# Patient Record
Sex: FEMALE | Race: WHITE | Employment: OTHER | URBAN - METROPOLITAN AREA
[De-identification: names, ages, dates, MRNs, and addresses within clinical notes are randomized per-mention and may not be internally consistent; named-entity substitution may affect disease eponyms.]

---

## 2017-11-01 PROBLEM — I15.8 OTHER SECONDARY HYPERTENSION: Status: ACTIVE | Noted: 2017-11-01

## 2017-11-01 PROBLEM — Z3A.10 10 WEEKS GESTATION OF PREGNANCY (HCC): Status: ACTIVE | Noted: 2017-11-01

## 2018-01-04 PROBLEM — Z3A.10 10 WEEKS GESTATION OF PREGNANCY (HCC): Status: RESOLVED | Noted: 2017-11-01 | Resolved: 2018-01-04

## 2018-01-04 PROBLEM — I10 ESSENTIAL HYPERTENSION: Status: ACTIVE | Noted: 2018-01-04

## 2018-01-04 PROBLEM — Z3A.21 21 WEEKS GESTATION OF PREGNANCY (HCC): Status: ACTIVE | Noted: 2018-01-04

## 2018-04-19 PROBLEM — Z34.90 PREGNANCY (HCC): Status: ACTIVE | Noted: 2018-04-19

## 2018-04-19 NOTE — H&P
34 [facility] [place] Patient Status:  Observation    1985 MRN [id number]   Location [address]  Attending Roxana Rodriguez MD   Hosp Day # 0 PCP Ruchi Davenport MD     Date • influenza complications [OTHER] Maternal Grandmother    • psoriasis feet [OTHER] Sister      Social History:    Smoking status: Light Tobacco Smoker     Types: Cigarettes    Smokeless tobacco: Never Used    Comment: rare - social 1x/month    Alcohol us Results  Component Value Date   WBC 8.4 04/19/2018   HGB 12.8 04/19/2018   HCT 36.9 04/19/2018    04/19/2018   CREATSERUM 0.92 04/19/2018   BUN 7 04/19/2018    04/19/2018   K 3.7 04/19/2018    04/19/2018   CO2 23 04/19/2018   GLU 93 04/1

## 2018-04-22 PROBLEM — O16.9 HYPERTENSION AFFECTING PREGNANCY (HCC): Status: ACTIVE | Noted: 2018-04-22

## 2018-04-23 NOTE — H&P
34 [facility] [place] Patient Status:  Inpatient    1985 MRN [id number]   Location [address]  Attending Arielle Salgado MD   Hosp Day # 0 PCP Ruchi Stoll MD     Date of Mother    • iron def Larwence Nikki Mother    • influenza complications [OTHER] Maternal Grandmother    • psoriasis feet [OTHER] Sister      Social History:    Smoking status: Former Smoker     Types: Cigarettes    Smokeless tobacco: Never Used    Comment: rare - admission procedures and expectations were discussed in detail. All questions answered, all appropriate consents will be signed at the Hospital. Admission is planned for last noc. Surendra Flanagan Received 2 doses cytotec.   SROM about 7AM.  Labor started about 1 hr a

## 2018-04-23 NOTE — ANESTHESIA PREPROCEDURE EVALUATION
Anesthesia PreOp Note    HPI:     Lashanda Roe is a 28year old female who presents for preoperative consultation requested by: * No surgeons listed *    Date of Surgery: 4/23/2018    * No procedures listed *  Indication: * No pre-op diagnosis entered 04/23/18 0300    hydrOXYzine HCl injection 50 mg 50 mg Intramuscular Q4H PRN Ivett Stewart MD 50 mg at 04/23/18 0300    Nalbuphine HCl (NUBAIN) 10 MG/ML injection         hydrOXYzine HCl 50 MG/ML injection         lactated ringers infusion         fentaNYL (PITOCIN) 30 units/ 500 ml premix infusion 300 mL/hr Intravenous Continuous Michael Pradhan MD     oxyTOCIN (PITOCIN) 30 units/ 500 ml premix infusion 0.5-20 saman-units/min Intravenous Continuous Michael Pradhan MD     Sod Citrate-Citric Acid (BIC 134 (L) 04/22/2018    03/09/2018   K 3.8 04/22/2018   K 4.1 03/09/2018    04/22/2018    03/09/2018   CO2 20 (L) 04/22/2018   CO2 23.4 03/09/2018   BUN 9 04/22/2018   BUN 9 03/09/2018   CREATSERUM 0.95 04/22/2018   CREATSERUM 0.83 03/09/20

## 2018-04-23 NOTE — ANESTHESIA PROCEDURE NOTES
Labor Analgesia  Performed by: Tanya Watkins  Authorized by: Tanya Watkins     Start Time:  4/23/2018 1:05 PM  End Time:  4/23/2018 1:15 PM  Site Identification: surface landmarks    Reason for Block: labor epidural    Anesthesiologist:  Stewart Phillips

## 2018-04-24 NOTE — L&D DELIVERY NOTE
Carolyn Ojeda  [id number]  2018  8:22 PM      Delivery Note    Type of delivery:   Fetus:  · Position:OA  · Sex:Female  · Weight:6lb 9oz  · Apgars:8/9  Anesthesia: epidual  Episiotomy: No  Laceration:   · Location:1st degree vaginal Larwance Childes

## 2018-04-24 NOTE — ANESTHESIA POSTPROCEDURE EVALUATION
Patient: Shraddha Herrera    Procedure Summary     Date:  04/23/18 Room / Location:      Anesthesia Start:  6926 Anesthesia Stop:  2019    Procedure:  LABOR ANALGESIA Diagnosis:      Scheduled Providers:   Anesthesiologist:  Lamont Miranda MD    Anesth

## 2018-04-24 NOTE — LACTATION NOTE
LACTATION NOTE - MOTHER      Evaluation Type: Inpatient    Problems identified  Problems identified: Knowledge deficit    Maternal history  Other/comment: asthma    Breastfeeding goal  Breastfeeding goal: To maintain breast milk feeding per patient goal

## 2018-04-24 NOTE — LACTATION NOTE
This note was copied from a baby's chart.   LACTATION NOTE - INFANT    Evaluation Type  Evaluation Type: Inpatient    Problems & Assessment  Infant Assessment: Anterior fontanel soft and flat;Skin color: pink or appropriate for ethnicity;Hunger cues present

## 2018-04-24 NOTE — DISCHARGE SUMMARY
[facility]    Discharge Summary    Vickey Habermann [place] Patient Status:  Inpatient    1985 MRN [id number]   Location [address]  Attending Krystle Billy MD   [facility] Day # 0       Admission Date: 2018  Disc

## 2018-04-24 NOTE — PROGRESS NOTES
Ivon Johnson  [id number]  April 24, 2018  11:33 AM      Post-Partum Vaginal Delivery    Subjective: Doing well, no complaints    Objective:  Tolerating present diet, pain well controlled, ambulating  /84 (BP Location: Right arm)   Pulse 85   Temp

## 2018-04-25 NOTE — PROGRESS NOTES
Post-Partum Note   4/25/2018, 9:32 AM    Subjective:  PPD 2  No complaints. Lochia normal. Voiding normal. Not dizzy. Mood good. No headache, visual changes, RUQ/epigastric pain.       Objective:   04/24/18 2000 04/24/18  2284 04/25/18  1657 04/25/18  8104

## 2018-04-25 NOTE — PLAN OF CARE
ANXIETY    • Will report anxiety at manageable levels Progressing        PAIN - ADULT    • Verbalizes/displays adequate comfort level or patient's stated pain goal Progressing        Patient Centered Care    • Patient preferences are identified and Monica Rich

## 2018-04-27 PROBLEM — O10.019 BENIGN ESSENTIAL HTN, CHRONIC, ANTEPARTUM (HCC): Status: ACTIVE | Noted: 2018-04-27

## 2018-04-27 NOTE — H&P
34 [facility] [place] Patient Status:  Observation    1985 MRN [id number]   Location [address]  Attending Shant Byrnes MD   Hosp Day # 0 PCP Ruchi Deleon MD     D Relation Age of Onset   • Hypertension Mother    • iron def [OTHER] Mother    • influenza complications [OTHER] Maternal Grandmother    • psoriasis feet [OTHER] Sister      Social History:    Smoking status: Former Smoker     Types: Cigarettes    Smokeless

## 2018-04-27 NOTE — DISCHARGE SUMMARY
[facility] - [facility]    Discharge Summary    Zoila C[place] Patient Status:  Observation    1985 MRN [id number]   Location [address]  Attending Melissa Lenz MD   Hosp Day # 0 PCP Ruchi De La Torre MD     Da

## 2018-04-27 NOTE — PROGRESS NOTES
Dr Annemarie Cavanaugh aware of all b/p's. Orders to discharge and have pt continue to monitor bp at home. Prescription for Labatelol sent to pt pharmacy.

## 2018-04-27 NOTE — PROGRESS NOTES
S: Pt w/o c/o, nursing baby. BP : 130-152/86 - 99. 136/93 @ 0900. T 98.2/ HR 83/ RR 16  Exam : defered    Imp 1: PPD #4 s/p          2.  Chronic HTN  w/chronic proteinuria - exacerbation of severe BP last pm. No other c/o,               no neuro sx an

## 2018-04-27 NOTE — PROGRESS NOTES
BP's results reported to Dr. Nahid Valladares and Andre Dominguez the discharge with FF-up in 3 days in office for BP check.  Patient also advised to take BP 2-3 x day and call MD if BP is < 150/100 or > 120/70 per Dr. Bin Bautista instructions and also S/S to be reported to MD.

## 2021-02-11 NOTE — H&P
Faheem Arreola is a 28year old female. HPI:   Patient presents with:  Establish Care: Previous PCP retired. OB is Dr. Jennifer Olmos, last pap smear done about 2 years ago. Would like to discuss anxiety medication.  Has seen nephrologist in past for nain half-marathon, tennis    HISTORY:  Past Medical History:   Diagnosis Date   • Anxiety    • Asthma     exercise induced   • Benign essential HTN, chronic, antepartum 4/27/2018   • Extrinsic asthma, unspecified     exertional asthma as child   • History of r Congestion, Sinus Pain, Hoarseness, Sore throat, Rhinorrhea, Swallowing Difficulty  Eyes: Eye Pain, Swelling, Redness, Foreign Body, Discharge, Vision Changes  Cardiovascular: Chest Pain, SOB, PND, Dyspnea on Exertion, Orthopnea, Claudication, Edema, Palpi bilaterally  Neurologic: No focal neurological deficits, CN II-XII intact, light touch intact, MSK Strength 5/5 and symmetric in all extremities, normal gait  Musculoskeletal: Full range of motion of all extremities, no clubbing/swelling/edema  Skin: No le decreased libido, weight gain, upset stomach. She will let us know in 1 to 2 weeks if tolerable, we can increase the dose as tolerated  –Low-dose alprazolam 0.25 mg as needed for severe anxiety.   We discussed that side effects can include cardiopulmonary is her most pressing symptom, we will try to treat this and see if the blood pressure is improved. DM Screen: Check fasting sugar  HLD Screen: Check fasting lipid panel  Osteoporosis Screen (>65 or < 65 with FRAX > 9.3%):  Not indicated  HCV Screen: Consid

## 2021-02-11 NOTE — PATIENT INSTRUCTIONS
- You were seen in clinic for regular annual check-up. We have ordered labs for you and we will call you with the results. Please obtain the blood work fasting for accurate sugar and cholesterol levels.   You may only drink water the day of your blood draw

## 2021-02-15 PROBLEM — N64.3 GALACTORRHEA: Status: ACTIVE | Noted: 2021-02-15

## 2021-02-22 NOTE — TELEPHONE ENCOUNTER
I reviewed the blood work from 2/22/2021    CMP: Alk phos 118  Lipid panel:  Total cholesterol 206, LDL/bad cholesterol 133  –TSH and CBC within normal limits    Urine studies  Microalbumin to creatinine ratio 978  Random total protein 2 total creatinine ra

## 2021-04-26 NOTE — TELEPHONE ENCOUNTER
ISAEL to Dr. Adan Nolan---  Spoke to patient who confirms she has been leonel lisinopril 10 mg daily since it was Rx'ed-pt mentions she is almost due for another refill. Rx pended.  Pt states she has not been checking her daily BP's and is unable to recall a recen

## 2021-04-26 NOTE — TELEPHONE ENCOUNTER
Received notification from her insurance company that lisinopril was not picked up. Please call the patient and have her give us a condition update regarding her blood pressures, hopefully she has been checking them at home.     Would still recommend lisin

## 2021-11-19 NOTE — TELEPHONE ENCOUNTER
Please call pt  She is requesting an order for a COVID test, her daughter tested positive for COVID  Tasked to nursing

## 2021-11-19 NOTE — TELEPHONE ENCOUNTER
Called patient who daughter tested positive for covid Tuesday - covid test ordered per protocol   Patient is asymptomatic, quarantine, RN explained ot check temp 2 times daily , if high fever SOB chest pain or oxygen below 90 % to go to ER - verbalized und

## 2021-12-15 NOTE — TELEPHONE ENCOUNTER
To MD:  The above refill request is for a controlled substance. Please review pended medication order. Print and sign for staff to fax to pharmacy or prescribe electronically.     Last office visit: 2/15/21  Last time refill sent and quantity/refills:2/1

## 2022-07-08 NOTE — TELEPHONE ENCOUNTER
Patient calling for appointment with Dr. Gerardo Mar today. Gave her 3:00pm appointment for today. Patient has persistent throat tickle/cough. No other symptoms. Has tested negative for Covid. Route to triage per Covid protocol. Patient is aware triage is calling her back prior to appointment.     Best call back number [phone]

## 2022-07-08 NOTE — TELEPHONE ENCOUNTER
Spoke to Sherita Cruz. Symptoms started last Thursday  Describes a tickle in the back of her throat that causes her to \"cough pretty hard\". Cough is dry. She sometimes coughs so hard that it causes her to throw up. Describes chest muscles as very sore from coughing so much. Was recently at [address], thought it could be allergies, but has persisted. Denies fever or chills. No runny nose.   No headache or dizziness  Denies body aches  Denies shortness of breath  Denies recent sick contacts    Took at home COVID tests last Friday and Tuesday, both were negative  Was positive for COVID in May    Pt was already scheduled for 3 pm today with Dr Sheryle Frankel instructed to call from her car to check in when she arrives    FYI to Dr Barragan Erm

## 2022-11-04 NOTE — TELEPHONE ENCOUNTER
alant to pt    \"We have your refills requests foe escitalopram and lisinopril, our records show these were last sent in April of 2021 - did you stop taking these at some point and then restarted? Or has another physician been refilling these for you?  Please confirm for us so that we can continue to refill these safely\"

## 2022-11-04 NOTE — TELEPHONE ENCOUNTER
To [facility] for consult, please see my mychart to pt, this was her response: \"Hi-they were refilled in June or July\"       Navya Trimble  does show pt refilled them June 13th last, it seems she might be stopping/restarting these. Do we need to do anything else here or cont refilling?

## 2023-08-02 PROBLEM — J45.20 MILD INTERMITTENT ASTHMA WITHOUT COMPLICATION (HCC): Status: ACTIVE | Noted: 2023-08-02

## 2023-08-02 NOTE — PATIENT INSTRUCTIONS
- You were seen in clinic for regular annual check-up. We have ordered labs for you and we will call you with the results. Please obtain the bloodwork fasting for 12 hours. OK to drink water the day of your blood draw. We have the lab downstairs on the first floor. No appointment is necessary. Lab hours are Monday-Friday 7:30 AM to 4:45 PM.  There may be Saturday hours 7 AM-11:00 AM as well. Otherwise you can obtain the blood tests on the weekends at the main hospital or local immediate care/urgent care within the [facility]. -Today, we did reevaluate your blood pressure. We should increase the dose of lisinopril to 20 mg once a day. Continue checking your blood pressure at home. We may need to adjust medications if still elevated.    -We discussed conservative measures to decrease blood pressure over time:    -Targeted weight loss has been found to be the most effective way to decrease blood pressure over time  -Optimizing nutrition with incorporation of high-fiber foods such as fruits, vegetables, whole-grain sources of carbohydrates (brown rice/wheat bread). A specific nutritional program is called the DASH diet  -Cardiovascular exercise at least 150 minutes of moderate intensity exercise per week  -Eliminating or minimizing alcohol  -Decreasing salt intake, caffeine intake  -Getting adequate sleep of at least 7-8 hours  -Managing stress and anxiety      - Please continue following up with OB/GYN for well woman examinations. You may establish with Dr. Davi Oneill and consider tubal ligation in the future. We do need to get your blood pressure down to safely proceed with tubal ligation.  - Lets also continue managing your anxiety. Lets keep the current dose of the escitalopram 10 mg once a day and Xanax on a as needed basis. However, we may consider increasing her escitalopram if anxiety is a predominant issue contributing to her elevated blood pressure.   - Please continue to eat a varied diet including recommended servings of vegetables, fruits, and low fat dairy. Minimize high saturated fats (such as fast foods) and high sugar intake (such as soda)  - We recommend 150 minutes of moderate intensity exercise (brisk walking, swimming) weekly to maintain your current weight. Targeted weight loss will require more vigorous exercise or more than 150 minutes/week.     Return to clinic in 3-4 months for follow-up

## 2023-08-02 NOTE — H&P
Mary Amezquita is a 45year old female. HPI:   Patient presents with:  Routine Physical    MsDayana Perdomo is a 45year old female PMHx asthma, history of preeclampsia, essential hypertension with proteinuria, anxiety who presents today for annual physical examination. Doing well otherwise. No pain. She can have short fuses with kids and feels it is related to her high BP, possibly some anxiety    Sleep is still a struggle, daughter with some regulatory issues. Working through stressors. Xanax is used moreso for social events. OBGYN  - LMP: last week, usual time. - Interested in getting her tubes tied. Amenable to seeing OB/GYN    I did update patient's past medical history, past surgical history, family history, social history and updated as below    [address]: 2 daughters; safe at home  - Work:  - full time in September. - Hobbies: tennis, half-marathon; cross-stitching, reading, crafts  - Nutrition: working on balanced - fruits and veggies moreso pasta/ pizza, oatmeals, eggs/chicken/pork/steak. Drinking a lot of water. Coffee  - Physical Activity: walking, biking with the kids. Weights.      HISTORY:  Past Medical History:   Diagnosis Date    Anxiety     Asthma     exercise induced    Benign essential HTN, chronic, antepartum 2018    Extrinsic asthma, unspecified     exertional asthma as child    History of reactive hypoglycemia     Preeclampsia 10/7/2014    Pregnancy-induced hypertension     Proteinuria       Past Surgical History:   Procedure Laterality Date          OTHER SURGICAL HISTORY      wisdom teeth x 4 --local only      Family History   Problem Relation Age of Onset    Hypertension Mother     Other (iron def) Mother     Other (influenza complications) Maternal Grandmother     Other (psoriasis feet) Sister       Social History:   Social History     Socioeconomic History    Marital status:    Tobacco Use    Smoking status: Former     Types: Cigarettes Smokeless tobacco: Never   Vaping Use    Vaping Use: Never used   Substance and Sexual Activity    Alcohol use: Yes     Alcohol/week: 1.7 standard drinks of alcohol     Types: 2 Glasses of wine per week     Comment: once every 2 weeks    Drug use: No        Medications (Active prior to today's visit):  Current Outpatient Medications   Medication Sig Dispense Refill    lisinopril 20 MG Oral Tab Take 1 tablet (20 mg total) by mouth daily. 90 tablet 3    ALPRAZolam 0.25 MG Oral Tab Take 1 tablet (0.25 mg total) by mouth every 6 (six) hours as needed. 30 tablet 1    escitalopram 10 MG Oral Tab Take 1 tablet (10 mg total) by mouth daily. 90 tablet 3    Albuterol Sulfate HFA (PROVENTIL HFA) 108 (90 BASE) MCG/ACT Inhalation Aero Soln Inhale 2 puffs into the lungs every 6 (six) hours as needed for Wheezing. 18 g 3       Allergies:  No Known Allergies      ROS:   Positive Findings indicated in BOLD    Constitutional: Fever, Chills, Weight Gain, Weight Loss, Night Sweats, Fatigue, Malaise  ENT/Mouth:  Hearing Changes, Ear Pain, Nasal Congestion, Sinus Pain, Hoarseness, Sore throat, Rhinorrhea, Swallowing Difficulty  Eyes: Eye Pain, Swelling, Redness, Foreign Body, Discharge, Vision Changes  Cardiovascular: Chest Pain, SOB, PND, Dyspnea on Exertion, Orthopnea, Claudication, Edema, Palpitations  Respiratory: Cough, Sputum, Wheezing, Shortness of breath  Gastrointestinal: Nausea, Vomiting, Diarrhea, Constipation, Pain, Heartburn, Dysphagia, Bloody stools, Tarry stools  Genitourinary: Dysmenorrhea, Dysuria, Urinary Frequency, Hematuria, Urinary Incontinence, Urgency,  Flank Pain  Musculoskeletal: Arthralgias, Myalgias, Joint Swelling, Joint Stiffness, Back Pain, Neck Pain  Integumentary: Skin Lesions, Pruritis, Hair Changes, Jaundice, Nail changes  Neuro: Weakness, Numbness, Paresthesias, Loss of Consciousness, Syncope, Dizziness, Headache, Falls  Psych:  Anxiety, Depression, Insomnia, Suicidal Ideation, Homicidal ideation, Memory Changes  Heme/Lymph: Bruising, Bleeding, Lymphadenopathy  Endocrine: Polyuria, Polydipsia, Temperature Intolerance    PHYSICAL EXAM:   Vital Signs:  Blood pressure (!) 147/99, pulse 103, resp. rate 18, height 5' 4\" (1.626 m), weight 218 lb 6 oz (99.1 kg), last menstrual period 07/25/2023, SpO2 98 %, not currently breastfeeding. Constitutional: No acute distress. Alert and oriented x 3. Eyes: EOMI, PERRLA, clear sclera b/l  HENT: NCAT, Moist mucous membranes, Oropharynx without erythema or exudate; +hirsutism of under the chin - mild  Neck: No JVD, no thyromegaly  Cardiovascular: S1, S2, no S3, no S4, Regular rate and rhythm, No murmurs/gallops/rubs. Vascular: Equal pulses 2+ carotids w/o bruits nor thrills/DP/PT  Respiratory: Clear to auscultation bilaterally. No wheezes/rales/rhonchi  Gastrointestinal: Soft, nontender, nondistended. Positive bowel sounds x 4. No rebound tenderness. No hepatomegaly, No splenomegaly  Genitourinary: No CVA tenderness bilaterally  Neurologic: No focal neurological deficits, CN II-XII intact, light touch intact, MSK Strength 5/5 and symmetric in all extremities, normal gait  Musculoskeletal: Full range of motion of all extremities, no clubbing/swelling/edema  Skin: No lesions, No erythema, no jaundice, Cap Refill < 2s  Psychiatric: Appropriate mood and affect  Heme/Lymph/Immune: No cervical  LAD    Breast exam: Appreciate assistance from chaperone VENITA Posadas  -Inspection: No suspicious skin abnormalities  -Palpation: No nodules palpated bilaterally  -No axillary lymphadenopathy bilaterally        DATA REVIEWED   Labs:  No results found for this or any previous visit (from the past 8760 hour(s)). No results found for this or any previous visit (from the past 8760 hour(s)). ASSESSMENT/PLAN:       History of preeclampsia  Essential hypertension  Persistent high blood pressures 170s/110s post-partum. Previously on labetalol. Repeat blood pressures remain elevated 150s. Discussed that this may be a continuation of her preeclampsia with essential hypertension. Also consider secondary causes with anxiety being a potential cause. - Increase lisinopril to 20 mg once a day  -Blood pressure log at home, she will let us know how her blood pressures are in the next visit  - She will check in with us through [facility] in 3-4 weeks on how her blood pressures are looking. Other conservative measures:  -Targeted weight loss has been found to be the most effective way to decrease blood pressure over time  -Optimizing nutrition with incorporation of high-fiber foods such as fruits, vegetables, whole-grain sources of carbohydrates (brown rice/wheat bread). A specific nutritional program is called the DASH diet  -Cardiovascular exercise at least 150 minutes of moderate intensity exercise per week  -Eliminating or minimizing alcohol  -Decreasing salt intake, caffeine intake  -Getting adequate sleep of at least 7-8 hours  -Managing stress and anxiety    Hyperlipidemia  , T chol 206  - Fasting blood work as below  - Weight loss efforts discussed    Anxiety  Going through psychosocial stressors at this time. Her usual coping mechanisms including exercise and outdoor activities unable to be done during the pandemic.  -She is established with therapy, Ms. Bharat Negrete. She will continue following up with her  -Lexapro 10 mg daily. We discussed the side effects and its delayed efficacy in about 4 to 6 weeks. She will let us know if she is experiencing any decreased libido, weight gain, upset stomach. She will let us know in 1 to 2 weeks if tolerable, we can increase the dose as tolerated  -Low-dose alprazolam 0.25 mg as needed for severe anxiety. We discussed that side effects can include cardiopulmonary depression. She should avoid other depressant substances including alcohol while on this medication.   Would only use judiciously during severe severe episodes of anxiety    Hirsutism  Consider possible PCOS. She reports a prior history of irregular menses, was able to get pregnant twice however and is more regular now. She does have evidence of hyperandrogenism with hirsutism under the chin.  -She fulfills 1 criteria for PCOS  - Follow-up with OB/GYN possible PCOS    Asthma  Home medications: Albuterol 2 puffs every 6 hours as needed  -Well-controlled this time, continue present management    Persistent proteinuria  Evaluated by nephrology in 2018. She experienced persistent proteinuria a year after delivery. -Most recent microalbumin creatinine ratio 978.7 in 2021  - Repeat urine studies       Orders This Visit:  Orders Placed This Encounter      CBC With Differential With Platelet      Comp Metabolic Panel (14)      Lipid Panel      Hemoglobin A1C      TSH W Reflex To Free T4      Urinalysis with Culture Reflex      Microalb/Creat Ratio, Random Urine      Meds This Visit:  Requested Prescriptions     Signed Prescriptions Disp Refills    lisinopril 20 MG Oral Tab 90 tablet 3     Sig: Take 1 tablet (20 mg total) by mouth daily. Imaging & Referrals:  OBG - INTERNAL       Health Maintenance  HTN Screen: Elevated as above -as anxiety is her most pressing symptom, we will try to treat this and see if the blood pressure is improved. DM Screen: Check fasting sugar  HLD Screen: Check fasting lipid panel  Osteoporosis Screen (>65 or < 65 with FRAX > 9.3%): Not indicated  HCV Screen: Considered low risk  HIV Screen: considered low risk  G/C/Syphilis: Considered low risk    Colon Cancer Screening (50-70): Not indicated  Breast Cancer Screening (50-70): Not indicated  Cervical Cancer Screening (21-64): Last Pap: DR. Maximino Alvarez - all normal; will establish with Dr. Olinda Jon at [address] [place] [address] [employer] (55-79 with 30 p/year and active < 15 years): Not indicated    Influenza: Annually - UTD  Td/Tdap: Last Tdap: Last 2013, due 2023  Zoster (60+):  Not indicated  HPV (19-26): Not indicated  Pneumococcal: Not indicated    Immunization History   Administered Date(s) Administered    Covid-19 Vaccine Pfizer 30 mcg/0.3 ml 02/20/2021, 03/13/2021, 12/28/2021    Covid-19 Vaccine Pfizer Bivalent 30mcg/0.3mL 10/29/2022    Flucelvax 0.5ml Vaccine 10/29/2022    Fluvirin, 3 Years & >, Im 10/30/2021    Influenza 12/15/2020    TDAP 01/07/2013       Nel Koch MD, 08/03/23, 4:38 PM

## 2024-02-18 NOTE — TELEPHONE ENCOUNTER
Reason for call:  Tested positive for COVID      Allergies:  No Known Allergies        Discussed with patient :  Youngest child was positive Friday. She tested positive for COVID. Achy, stuffy. She was nauseous. Felt like a bad hangover, some still persistent symptoms. Manageable at home, does not feel lke she hast o go to ER      Recommendations:  Sent paxlovid    Recommend that the patient continue to check temperature and if possible pulse ox at home.  Monitor for any worsening of symptoms such as worsening fevers, worsening shortness of breath, any new or severe pain in the chest or legs.  If so, patient should call the clinic as a more urgent evaluation may be needed.      Patient should focus on symptomatic support at this time.  Can use NSAIDs for any pain, headaches.  Continue to maintain nutrition and hydration status.    Requirements for ending self quarantine if COVID test is positive.  - Time duration of 5 days from symptom onset.  If clinically improving, can discontinue quarantine after day 5 and wear a mask for additional 5 days.  If still significant symptoms, can proceed with a full 10-day course of quarantining.  -Overall clinical improvement  - No fever for at least 24 hours without the use of any antipyretics (such as Tylenol)    If tested positive, no repeat testing should be done within 90 days.  As test can remain positive even after resolution of symptoms.

## 2024-04-04 ENCOUNTER — TELEPHONE (OUTPATIENT)
Dept: INTERNAL MEDICINE CLINIC | Facility: CLINIC | Age: 39
End: 2024-04-04

## 2024-04-04 DIAGNOSIS — F41.9 ANXIETY: ICD-10-CM

## 2024-04-04 NOTE — TELEPHONE ENCOUNTER
Pt called, Alprazolam prescription is     Requests refill is sent to [facility] in [place]

## 2024-04-05 RX ORDER — ALPRAZOLAM 0.25 MG/1
0.25 TABLET ORAL EVERY 6 HOURS PRN
Qty: 30 TABLET | Refills: 1 | Status: SHIPPED | OUTPATIENT
Start: 2024-04-05

## 2024-04-05 NOTE — TELEPHONE ENCOUNTER
To MD:  The above refill request is for a controlled substance.  Please review pended medication order.   Print and sign for staff to fax to pharmacy or prescribe electronically.    Last office visit: 8/3/23  Last time refill sent and quantity/refills: 1/17/2024 qty 30 plus 1

## 2024-08-16 PROBLEM — Z34.90 PREGNANCY (HCC): Status: RESOLVED | Noted: 2018-04-19 | Resolved: 2024-08-16

## 2024-08-16 PROBLEM — Z3A.21 21 WEEKS GESTATION OF PREGNANCY (HCC): Status: RESOLVED | Noted: 2018-01-04 | Resolved: 2024-08-16

## 2024-08-16 PROBLEM — O10.019 BENIGN ESSENTIAL HTN, CHRONIC, ANTEPARTUM (HCC): Status: RESOLVED | Noted: 2018-04-27 | Resolved: 2024-08-16

## 2024-08-16 PROBLEM — O16.9 HYPERTENSION AFFECTING PREGNANCY (HCC): Status: RESOLVED | Noted: 2018-04-22 | Resolved: 2024-08-16

## 2024-11-06 RX ORDER — LISINOPRIL 20 MG/1
20 TABLET ORAL DAILY
Qty: 90 TABLET | Refills: 0 | Status: SHIPPED | OUTPATIENT
Start: 2024-11-06

## 2024-11-06 NOTE — TELEPHONE ENCOUNTER
Noted. Refill sent to last patient until the time of the appointment.    Requested Prescriptions     Signed Prescriptions Disp Refills    lisinopril 20 MG Oral Tab 90 tablet 0     Sig: TAKE 1 TABLET(20 MG) BY MOUTH DAILY     Authorizing Provider: DAVID KITCHEN     Ordering User: CATIE WHEATLEY

## 2024-11-06 NOTE — TELEPHONE ENCOUNTER
Patient last saw  8/3/23. Needs appointment for annual physical. To EMA  to please call patient and assist with scheduling then back to Rx group

## 2025-01-26 NOTE — H&P
Carmencita Gutierrez is a 39 year old female.    HPI:     Chief Complaint   Patient presents with    Physical     See's gyne at [facility]. Will be making an appointment sone     Ms. Condon is a 39 year old female PMHx asthma, history of preeclampsia, essential hypertension with proteinuria, anxiety who presents today for annual physical examination.    No pain any where.    Work-life balance is good. Flexible work but more demands with kids' activities. 6-7 hours. No anxiety nor depression. The anxiety has improved. Xanax use is very minimal for big group gratherings.      OBGYN  - LMP: above 1 month. It is regular.     I did update patient's past medical history, past surgical history, family history, social history and updated as below    SocHX  - Home: 2 daughters; safe at home  - Work:  - full time in September.  - Hobbies: cross-stitching, reading, crafts.  - Nutrition: salads, leftovers for lunch, pasta - lean meats. Occasional pizza. Fruits. Water is good.   - Physical Activity: not as much - walking, difficulty with the winter. Soccer and playing with kids.     HISTORY:  Past Medical History:    Anxiety    Asthma (HCC)    exercise induced    Benign essential HTN, chronic, antepartum (HCC)    Extrinsic asthma, unspecified    exertional asthma as child    History of reactive hypoglycemia    Preeclampsia (HCC)    Pregnancy-induced hypertension (HCC)    Proteinuria      Past Surgical History:   Procedure Laterality Date          Other surgical history      wisdom teeth x 4 --local only      Family History   Problem Relation Age of Onset    Hypertension Mother     Other (iron def) Mother     Other (influenza complications) Maternal Grandmother     Other (psoriasis feet) Sister       Social History:   Social History     Socioeconomic History    Marital status:    Tobacco Use    Smoking status: Former     Types: Cigarettes     Passive exposure: Never    Smokeless tobacco: Never   Vaping Use     Vaping status: Never Used   Substance and Sexual Activity    Alcohol use: Yes     Alcohol/week: 2.0 standard drinks of alcohol     Types: 2 Glasses of wine per week     Comment: once every 2 weeks    Drug use: No   Social History Narrative    ** Merged History Encounter **             Medications (Active prior to today's visit):  Current Outpatient Medications   Medication Sig Dispense Refill    ALPRAZolam 0.25 MG Oral Tab Take 1 tablet (0.25 mg total) by mouth every 6 (six) hours as needed. 30 tablet 1    lisinopril 30 MG Oral Tab Take 1 tablet (30 mg total) by mouth daily. 90 tablet 3       Allergies:  No Known Allergies      ROS:   Positive Findings indicated in BOLD    Constitutional: Fever, Chills, Weight Gain, Weight Loss, Night Sweats, Fatigue, Malaise  ENT/Mouth:  Hearing Changes, Ear Pain, Nasal Congestion, Sinus Pain, Hoarseness, Sore throat, Rhinorrhea, Swallowing Difficulty  Eyes: Eye Pain, Swelling, Redness, Foreign Body, Discharge, Vision Changes  Cardiovascular: Chest Pain, SOB, PND, Dyspnea on Exertion, Orthopnea, Claudication, Edema, Palpitations  Respiratory: Cough, Sputum, Wheezing, Shortness of breath  Gastrointestinal: Nausea, Vomiting, Diarrhea, Constipation, Pain, Heartburn, Dysphagia, Bloody stools, Tarry stools  Genitourinary: Dysmenorrhea, Dysuria, Urinary Frequency, Hematuria, Urinary Incontinence, Urgency,  Flank Pain  Musculoskeletal: Arthralgias, Myalgias, Joint Swelling, Joint Stiffness, Back Pain, Neck Pain  Integumentary: Skin Lesions, Pruritis, Hair Changes, Jaundice, Nail changes  Neuro: Weakness, Numbness, Paresthesias, Loss of Consciousness, Syncope, Dizziness, Headache, Falls  Psych: Anxiety, Depression, Insomnia, Suicidal Ideation, Homicidal ideation, Memory Changes  Heme/Lymph: Bruising, Bleeding, Lymphadenopathy  Endocrine: Polyuria, Polydipsia, Temperature Intolerance    PHYSICAL EXAM:   Vital Signs:  Blood pressure 127/90, pulse 74, temperature 97.9 °F (36.6 °C),  temperature source Oral, height 5' 4\" (1.626 m), weight 215 lb 6.4 oz (97.7 kg), last menstrual period 12/20/2024, SpO2 98%, not currently breastfeeding.      Constitutional: No acute distress. Alert and oriented x 3.  Eyes: EOMI, PERRLA, clear sclera b/l  HENT: NCAT, Moist mucous membranes, Oropharynx without erythema or exudate; +hirsutism of under the chin - mild  Neck: No JVD, no thyromegaly  Cardiovascular: S1, S2, no S3, no S4, Regular rate and rhythm, No murmurs/gallops/rubs.   Vascular: Equal pulses 2+ carotids w/o bruits nor thrills/DP/PT  Respiratory: Clear to auscultation bilaterally.  No wheezes/rales/rhonchi  Gastrointestinal: Soft, nontender, nondistended. Positive bowel sounds x 4. No rebound tenderness. No hepatomegaly, No splenomegaly  Genitourinary: No CVA tenderness bilaterally  Neurologic: No focal neurological deficits, CN II-XII intact, light touch intact, MSK Strength 5/5 and symmetric in all extremities, normal gait  Musculoskeletal: Full range of motion of all extremities, no clubbing/swelling/edema  Skin: No lesions, No erythema, no jaundice, Cap Refill < 2s  Psychiatric: Appropriate mood and affect  Heme/Lymph/Immune: No cervical  LAD    Breast exam: no chaperone requested  -Inspection: No suspicious skin abnormalities  -Palpation: No nodules palpated bilaterally  -No axillary lymphadenopathy bilaterally        DATA REVIEWED   Labs:  No results found for this or any previous visit (from the past 8760 hours).    No results found for this or any previous visit (from the past 8760 hours).          ASSESSMENT/PLAN:       History of preeclampsia  Essential hypertension  Persistent high blood pressures 170s/110s post-partum.  Previously on labetalol.  Repeat blood pressures remain elevated 150s.  Discussed that this may be a continuation of her preeclampsia with essential hypertension.  Also consider secondary causes with anxiety being a potential cause.  - Increase lisinopril to 30 mg once a  day    Other conservative measures:  -Targeted weight loss has been found to be the most effective way to decrease blood pressure over time  -Optimizing nutrition with incorporation of high-fiber foods such as fruits, vegetables, whole-grain sources of carbohydrates (brown rice/wheat bread).  A specific nutritional program is called the DASH diet  -Cardiovascular exercise at least 150 minutes of moderate intensity exercise per week  -Eliminating or minimizing alcohol  -Decreasing salt intake, caffeine intake  -Getting adequate sleep of at least 7-8 hours  -Managing stress and anxiety    Hyperlipidemia  , T chol 206  - Fasting blood work as below  - Weight loss efforts discussed    Anxiety  Going through psychosocial stressors at this time.  Her usual coping mechanisms including exercise and outdoor activities unable to be done during the pandemic.  -She is established with therapy  -Low-dose alprazolam 0.25 mg as needed for severe anxiety.  We discussed that side effects can include cardiopulmonary depression.  She should avoid other depressant substances including alcohol while on this medication.  Would only use judiciously during severe severe episodes of anxiety    Hirsutism  Consider possible PCOS.  She reports a prior history of irregular menses, was able to get pregnant twice however and is more regular now.  She does have evidence of hyperandrogenism with hirsutism under the chin.  -She fulfills 1 criteria for PCOS  - Follow-up with OB/GYN possible PCOS    Asthma  Home medications: Albuterol 2 puffs every 6 hours as needed  -Well-controlled this time, continue present management    Persistent proteinuria  Evaluated by nephrology in 2018.  She experienced persistent proteinuria a year after delivery.  -Most recent microalbumin creatinine ratio 978.7 in 2021  - Repeat urine studies  - She would like to see Nephrology, referral for Dr. Samuel Ba This Visit:  Orders Placed This Encounter    Procedures    CBC With Differential With Platelet    Comp Metabolic Panel (14)    Hemoglobin A1C    Lipid Panel    TSH W Reflex To Free T4    Microalb/Creat Ratio, Random Urine    TETANUS, DIPHTHERIA TOXOIDS AND ACELLULAR PERTUSIS VACCINE (TDAP), >7 YEARS, IM USE       Meds This Visit:  Requested Prescriptions     Signed Prescriptions Disp Refills    ALPRAZolam 0.25 MG Oral Tab 30 tablet 1     Sig: Take 1 tablet (0.25 mg total) by mouth every 6 (six) hours as needed.    lisinopril 30 MG Oral Tab 90 tablet 3     Sig: Take 1 tablet (30 mg total) by mouth daily.       Imaging & Referrals:  NEPHROLOGY - INTERNAL  TETANUS, DIPHTHERIA TOXOIDS AND ACELLULAR PERTUSIS VACCINE (TDAP), >7 YEARS, IM USE  OBG - INTERNAL       Health Maintenance  HTN Screen: Elevated as above   DM Screen: Check fasting sugar  HLD Screen: Check fasting lipid panel  Osteoporosis Screen (>65 or < 65 with FRAX > 9.3%): Not indicated  HCV Screen: Considered low risk  HIV Screen: considered low risk  G/C/Syphilis: Considered low risk    Colon Cancer Screening (45-70): Not indicated  Breast Cancer Screening (50-70): Not indicated  Cervical Cancer Screening (21-64): Last Pap: DR. Mcintyre - all normal; will establish with Dr. Carias at [facility]  Lung Cancer Screening (55-79 with 30 p/year and active < 15 years): Not indicated    Influenza: Annually - UTD  Td/Tdap: Last Tdap: Last 2013, due   Zoster (60+): Not indicated  HPV (19-26): Not indicated  Pneumococcal: Not indicated    Immunization History   Administered Date(s) Administered    Covid-19 Vaccine Pfizer 30 mcg/0.3 ml 02/20/2021, 03/13/2021, 12/28/2021    Covid-19 Vaccine Pfizer Bivalent 30mcg/0.3mL 10/29/2022    Flucelvax Influenza vaccine, trivalent (ccIIV3), 0.5mL IM 10/29/2022, 10/15/2023, 09/27/2024    Fluvirin, 3 Years & >, Im 10/30/2021    Influenza 12/15/2020    Pfizer Covid-19 Vaccine 30mcg/0.3ml 12yrs+ 10/15/2023, 09/27/2024    TDAP 01/07/2013   Pended Date(s) Pended    TDAP 01/28/2025        Delmar Gan MD, 01/28/25, 9:17 AM

## 2025-01-26 NOTE — PATIENT INSTRUCTIONS
- You were seen in clinic for regular annual check-up. We have ordered labs for you and we will call you with the results. Please obtain the bloodwork fasting for 10**12 hours. OK to drink water the day of your blood draw.      We have the lab downstairs on the first floor.  No appointment is necessary.  Lab hours are Monday-Friday 7:30 AM to 4:45 PM.  There may be Saturday hours 7 AM-11:00 AM as well.     Otherwise you can obtain the blood tests on the weekends at the main hospital or local immediate care/urgent care within the [facility].    -Lets continue managing anxiety.  - Following up with therapy  -Low-dose alprazolam 0.25 mg as needed for severe anxiety.  We discussed that side effects can include cardiopulmonary depression.  She should avoid other depressant substances including alcohol while on this medication.  Would only use judiciously during severe severe episodes of anxiety  -If needed, we can consider management with therapy    -Please continue checking your blood pressures at home and notify us if they are increasing    -We discussed conservative measures to decrease blood pressure over time:    -Targeted weight loss has been found to be the most effective way to decrease blood pressure over time  -Optimizing nutrition with incorporation of high-fiber foods such as fruits, vegetables, whole-grain sources of carbohydrates (brown rice/wheat bread).  A specific nutritional program is called the DASH diet  -Cardiovascular exercise at least 150 minutes of moderate intensity exercise per week  -Eliminating or minimizing alcohol  -Decreasing salt intake, caffeine intake  -Getting adequate sleep of at least 7-8 hours  -Managing stress and anxiety    - If no significant improvement with conservative measures, we should consider starting blood pressure medications if blood pressures remain significantly elevated.      We have increased your lisinopril to 30 mg once a day  - Lets have you see   Samuel King of nephrology for surveillance of the kidneys.  Will revisit the need for possible biopsy    - Please continue following up with OB/GYN for well woman examinations and Pap smear  - Vaccines you may be due for: Tetanus shot, Prevnar 20/pneumonia shot  - Please continue to eat a varied diet including recommended servings of vegetables, fruits, and low fat dairy. Minimize high saturated fats (such as fast foods) and high sugar intake (such as soda)  - We recommend 150 minutes of moderate intensity exercise (brisk walking, swimming) weekly to maintain your current weight.  Targeted weight loss will require more vigorous exercise or more than 150 minutes/week.    Return to clinic in 6 months for follow-up

## 2025-01-28 ENCOUNTER — OFFICE VISIT (OUTPATIENT)
Dept: INTERNAL MEDICINE CLINIC | Facility: CLINIC | Age: 40
End: 2025-01-28

## 2025-01-28 VITALS
TEMPERATURE: 98 F | WEIGHT: 215.38 LBS | OXYGEN SATURATION: 98 % | BODY MASS INDEX: 36.77 KG/M2 | DIASTOLIC BLOOD PRESSURE: 90 MMHG | HEART RATE: 74 BPM | SYSTOLIC BLOOD PRESSURE: 127 MMHG | HEIGHT: 64 IN

## 2025-01-28 DIAGNOSIS — I10 ESSENTIAL HYPERTENSION: ICD-10-CM

## 2025-01-28 DIAGNOSIS — Z13.0 SCREENING FOR DEFICIENCY ANEMIA: ICD-10-CM

## 2025-01-28 DIAGNOSIS — Z01.419 WELL WOMAN EXAM: ICD-10-CM

## 2025-01-28 DIAGNOSIS — Z00.00 ANNUAL PHYSICAL EXAM: Primary | ICD-10-CM

## 2025-01-28 DIAGNOSIS — R80.1 PERSISTENT PROTEINURIA: ICD-10-CM

## 2025-01-28 DIAGNOSIS — Z13.1 SCREENING FOR DIABETES MELLITUS: ICD-10-CM

## 2025-01-28 DIAGNOSIS — Z13.29 SCREENING FOR THYROID DISORDER: ICD-10-CM

## 2025-01-28 DIAGNOSIS — E78.2 MIXED HYPERLIPIDEMIA: ICD-10-CM

## 2025-01-28 DIAGNOSIS — F41.9 ANXIETY: ICD-10-CM

## 2025-01-28 DIAGNOSIS — Z86.39 HISTORY OF REACTIVE HYPOGLYCEMIA: ICD-10-CM

## 2025-01-28 PROCEDURE — 3074F SYST BP LT 130 MM HG: CPT | Performed by: INTERNAL MEDICINE

## 2025-01-28 PROCEDURE — 3080F DIAST BP >= 90 MM HG: CPT | Performed by: INTERNAL MEDICINE

## 2025-01-28 PROCEDURE — 3008F BODY MASS INDEX DOCD: CPT | Performed by: INTERNAL MEDICINE

## 2025-01-28 PROCEDURE — 90715 TDAP VACCINE 7 YRS/> IM: CPT | Performed by: INTERNAL MEDICINE

## 2025-01-28 PROCEDURE — 90471 IMMUNIZATION ADMIN: CPT | Performed by: INTERNAL MEDICINE

## 2025-01-28 PROCEDURE — 99395 PREV VISIT EST AGE 18-39: CPT | Performed by: INTERNAL MEDICINE

## 2025-01-28 RX ORDER — ALPRAZOLAM 0.25 MG/1
0.25 TABLET ORAL EVERY 6 HOURS PRN
Qty: 30 TABLET | Refills: 1 | Status: SHIPPED | OUTPATIENT
Start: 2025-01-28

## 2025-01-28 RX ORDER — LISINOPRIL 30 MG/1
30 TABLET ORAL DAILY
Qty: 90 TABLET | Refills: 3 | Status: SHIPPED | OUTPATIENT
Start: 2025-01-28

## (undated) NOTE — LETTER
Patient Name: Blaze Pleitez  : 1985  MRN: [id number]  Patient Address: [address] [place] [address]      Coronavirus Disease 2019 (COVID-19)     [facility] is committed to the safety and well-being of our patients, members, em carefully. If your symptoms get worse, call your healthcare provider immediately. 3. Get rest and stay hydrated.    4. If you have a medical appointment, call the healthcare provider ahead of time and tell them that you have or may have COVID-19.  5. For m of fever-reducing medications; and  · Improvement in respiratory symptoms (e.g., cough, shortness of breath); and  · At least 10 days have passed since symptoms first appeared OR if asymptomatic patient or date of symptom onset is unclear then use 10 days donors must:    · Have had a confirmed diagnosis of COVID-19  · Be symptom-free for at least 14 days*    *Some people will be required to have a repeat COVID-19 test in order to be eligible to donate.  If you’re instructed by [facility] that a repeat test is r random. Researchers are trying to identify similarities between people with a Post-COVID condition to better understand if there are risk factors. How do I prevent a Post-COVID condition?   The best way to prevent the long-term symptoms of COVID-19 is